# Patient Record
Sex: MALE | Race: WHITE | NOT HISPANIC OR LATINO | Employment: OTHER | URBAN - NONMETROPOLITAN AREA
[De-identification: names, ages, dates, MRNs, and addresses within clinical notes are randomized per-mention and may not be internally consistent; named-entity substitution may affect disease eponyms.]

---

## 2023-06-13 ENCOUNTER — TELEPHONE (OUTPATIENT)
Dept: PRIMARY CARE | Facility: CLINIC | Age: 58
End: 2023-06-13
Payer: MEDICARE

## 2023-06-13 DIAGNOSIS — N52.9 ERECTILE DYSFUNCTION, UNSPECIFIED ERECTILE DYSFUNCTION TYPE: Primary | ICD-10-CM

## 2023-06-13 RX ORDER — ASPIRIN 325 MG
1 TABLET ORAL DAILY
COMMUNITY
Start: 2020-10-23

## 2023-06-13 RX ORDER — METOPROLOL SUCCINATE 100 MG/1
1 TABLET, EXTENDED RELEASE ORAL DAILY
COMMUNITY
Start: 2019-10-31 | End: 2024-03-18 | Stop reason: SDUPTHER

## 2023-06-13 RX ORDER — TADALAFIL 20 MG/1
20 TABLET ORAL DAILY PRN
Qty: 12 TABLET | Refills: 3 | Status: SHIPPED | OUTPATIENT
Start: 2023-06-13 | End: 2024-03-18 | Stop reason: WASHOUT

## 2023-06-13 RX ORDER — OMEPRAZOLE 20 MG/1
1 CAPSULE, DELAYED RELEASE ORAL DAILY
COMMUNITY
Start: 2019-10-31 | End: 2024-03-18 | Stop reason: SDUPTHER

## 2023-06-13 RX ORDER — AMLODIPINE BESYLATE 5 MG/1
1 TABLET ORAL DAILY
COMMUNITY
Start: 2019-10-31 | End: 2024-03-18 | Stop reason: SDUPTHER

## 2023-06-13 RX ORDER — LISINOPRIL 10 MG/1
1 TABLET ORAL DAILY
COMMUNITY
Start: 2019-10-31 | End: 2024-03-18 | Stop reason: ALTCHOICE

## 2024-01-08 ENCOUNTER — TELEPHONE (OUTPATIENT)
Dept: PRIMARY CARE | Facility: CLINIC | Age: 59
End: 2024-01-08
Payer: MEDICARE

## 2024-01-08 NOTE — TELEPHONE ENCOUNTER
Called twice in regards to missed appointment on 01/08/2024, VM full, could not LVM, phone went straight to  both times.

## 2024-03-18 ENCOUNTER — OFFICE VISIT (OUTPATIENT)
Dept: PRIMARY CARE | Facility: CLINIC | Age: 59
End: 2024-03-18
Payer: MEDICARE

## 2024-03-18 ENCOUNTER — LAB (OUTPATIENT)
Dept: LAB | Facility: LAB | Age: 59
End: 2024-03-18
Payer: MEDICARE

## 2024-03-18 VITALS
BODY MASS INDEX: 16.32 KG/M2 | OXYGEN SATURATION: 99 % | SYSTOLIC BLOOD PRESSURE: 78 MMHG | HEART RATE: 73 BPM | HEIGHT: 70 IN | WEIGHT: 114 LBS | DIASTOLIC BLOOD PRESSURE: 60 MMHG

## 2024-03-18 DIAGNOSIS — J86.9 EMPYEMA (MULTI): ICD-10-CM

## 2024-03-18 DIAGNOSIS — R33.9 URINARY RETENTION: ICD-10-CM

## 2024-03-18 DIAGNOSIS — R63.4 WEIGHT LOSS: ICD-10-CM

## 2024-03-18 DIAGNOSIS — I63.10 CEREBROVASCULAR ACCIDENT (CVA) DUE TO EMBOLISM OF PRECEREBRAL ARTERY (MULTI): ICD-10-CM

## 2024-03-18 DIAGNOSIS — K21.00 GASTROESOPHAGEAL REFLUX DISEASE WITH ESOPHAGITIS WITHOUT HEMORRHAGE: Primary | ICD-10-CM

## 2024-03-18 DIAGNOSIS — E78.2 MIXED HYPERLIPIDEMIA: ICD-10-CM

## 2024-03-18 DIAGNOSIS — I10 PRIMARY HYPERTENSION: ICD-10-CM

## 2024-03-18 PROBLEM — K21.9 GERD (GASTROESOPHAGEAL REFLUX DISEASE): Status: ACTIVE | Noted: 2024-03-18

## 2024-03-18 PROBLEM — E78.5 HYPERLIPIDEMIA: Status: ACTIVE | Noted: 2024-03-18

## 2024-03-18 PROBLEM — I63.9 STROKE (MULTI): Status: ACTIVE | Noted: 2024-03-18

## 2024-03-18 PROCEDURE — 82435 ASSAY OF BLOOD CHLORIDE: CPT

## 2024-03-18 PROCEDURE — 84155 ASSAY OF PROTEIN SERUM: CPT

## 2024-03-18 PROCEDURE — 82247 BILIRUBIN TOTAL: CPT

## 2024-03-18 PROCEDURE — 82310 ASSAY OF CALCIUM: CPT

## 2024-03-18 PROCEDURE — 84460 ALANINE AMINO (ALT) (SGPT): CPT

## 2024-03-18 PROCEDURE — 3074F SYST BP LT 130 MM HG: CPT | Performed by: FAMILY MEDICINE

## 2024-03-18 PROCEDURE — 99214 OFFICE O/P EST MOD 30 MIN: CPT | Performed by: FAMILY MEDICINE

## 2024-03-18 PROCEDURE — 84443 ASSAY THYROID STIM HORMONE: CPT

## 2024-03-18 PROCEDURE — 82607 VITAMIN B-12: CPT

## 2024-03-18 PROCEDURE — 36415 COLL VENOUS BLD VENIPUNCTURE: CPT

## 2024-03-18 PROCEDURE — 85025 COMPLETE CBC W/AUTO DIFF WBC: CPT

## 2024-03-18 PROCEDURE — 82374 ASSAY BLOOD CARBON DIOXIDE: CPT

## 2024-03-18 PROCEDURE — 3078F DIAST BP <80 MM HG: CPT | Performed by: FAMILY MEDICINE

## 2024-03-18 PROCEDURE — 84450 TRANSFERASE (AST) (SGOT): CPT

## 2024-03-18 PROCEDURE — 82565 ASSAY OF CREATININE: CPT

## 2024-03-18 PROCEDURE — 84295 ASSAY OF SERUM SODIUM: CPT

## 2024-03-18 PROCEDURE — 84520 ASSAY OF UREA NITROGEN: CPT

## 2024-03-18 PROCEDURE — 84075 ASSAY ALKALINE PHOSPHATASE: CPT

## 2024-03-18 PROCEDURE — 82040 ASSAY OF SERUM ALBUMIN: CPT

## 2024-03-18 RX ORDER — OMEPRAZOLE 40 MG/1
40 CAPSULE, DELAYED RELEASE ORAL DAILY
Qty: 90 CAPSULE | Refills: 3 | Status: SHIPPED | OUTPATIENT
Start: 2024-03-18 | End: 2024-05-22

## 2024-03-18 RX ORDER — LISINOPRIL 10 MG/1
10 TABLET ORAL DAILY
Qty: 90 TABLET | Refills: 3 | Status: CANCELLED | OUTPATIENT
Start: 2024-03-18 | End: 2025-03-18

## 2024-03-18 RX ORDER — AMLODIPINE BESYLATE 5 MG/1
5 TABLET ORAL DAILY
Qty: 90 TABLET | Refills: 3 | Status: SHIPPED | OUTPATIENT
Start: 2024-03-18 | End: 2024-04-01 | Stop reason: DRUGHIGH

## 2024-03-18 RX ORDER — METOPROLOL SUCCINATE 100 MG/1
100 TABLET, EXTENDED RELEASE ORAL DAILY
Qty: 90 TABLET | Refills: 3 | Status: SHIPPED | OUTPATIENT
Start: 2024-03-18 | End: 2024-05-06 | Stop reason: SDUPTHER

## 2024-03-18 ASSESSMENT — ENCOUNTER SYMPTOMS
DIARRHEA: 0
DIZZINESS: 0
ABDOMINAL PAIN: 0
ABDOMINAL DISTENTION: 0
WHEEZING: 0
ACTIVITY CHANGE: 0
LIGHT-HEADEDNESS: 0
CONSTIPATION: 0
UNEXPECTED WEIGHT CHANGE: 1
ADENOPATHY: 0
ARTHRALGIAS: 0
COUGH: 0
VOMITING: 0
TROUBLE SWALLOWING: 0
RHINORRHEA: 0
DYSPHORIC MOOD: 0
DIFFICULTY URINATING: 0
NERVOUS/ANXIOUS: 0
SLEEP DISTURBANCE: 0
APPETITE CHANGE: 0
NAUSEA: 1
FATIGUE: 1
SHORTNESS OF BREATH: 0
PALPITATIONS: 0
NUMBNESS: 0
HEADACHES: 0

## 2024-03-18 NOTE — ASSESSMENT & PLAN NOTE
Prior stroke, blood pressure under good control, may need to have statin, patient dependent on family for help with activities of daily living uses a wheeled walker to get around.

## 2024-03-18 NOTE — ASSESSMENT & PLAN NOTE
Advised about meal supplementation, refer to GI for evaluation of dysphagia, check blood testing today.

## 2024-03-18 NOTE — PROGRESS NOTES
"Subjective   Patient ID: Deep Whyte is a 58 y.o. male who presents for Annual Exam.    HPI   Post hospitlaization for empyema, UGI bleed with Anastasia Wiess tear, was in [facility] after for rehab, has lost 40 pounds since last seen, Home since February, no HHC services, using walker to get around, no falls since home, occ ETOH (reduced from prior), rare tobacco (cut back)  No headache, chest pain, shortness of breath, dizziness, lightheadedness, or edema  Occ dysphagia, no GI follow-up, no constipaiton  No cath in bladder, has F/U with urology next week, no urine flow or stream issues  + appetite, no nausea      Review of Systems   Constitutional:  Positive for fatigue and unexpected weight change. Negative for activity change and appetite change.   HENT:  Negative for congestion, ear pain, nosebleeds, rhinorrhea, sneezing and trouble swallowing.    Respiratory:  Negative for cough, shortness of breath and wheezing.    Cardiovascular:  Negative for chest pain, palpitations and leg swelling.   Gastrointestinal:  Positive for nausea. Negative for abdominal distention, abdominal pain, constipation, diarrhea and vomiting.   Genitourinary:  Negative for difficulty urinating.   Musculoskeletal:  Positive for gait problem. Negative for arthralgias.   Skin:  Negative for rash.   Neurological:  Negative for dizziness, light-headedness, numbness and headaches.   Hematological:  Negative for adenopathy.   Psychiatric/Behavioral:  Negative for behavioral problems, dysphoric mood and sleep disturbance. The patient is not nervous/anxious.    All other systems reviewed and are negative.      Objective   BP 78/60   Pulse 73   Ht 1.778 m (5' 10\")   Wt 51.7 kg (114 lb)   SpO2 99%   BMI 16.36 kg/m²     Physical Exam  Vitals and nursing note reviewed.   Constitutional:       General: He is not in acute distress.     Appearance: Normal appearance. He is not toxic-appearing.   HENT:      Head: Normocephalic and atraumatic.      Right " Ear: Tympanic membrane, ear canal and external ear normal.      Left Ear: Tympanic membrane, ear canal and external ear normal.      Nose: Nose normal.      Mouth/Throat:      Mouth: Mucous membranes are moist.      Pharynx: Oropharynx is clear.   Eyes:      Extraocular Movements: Extraocular movements intact.      Conjunctiva/sclera: Conjunctivae normal.      Pupils: Pupils are equal, round, and reactive to light.   Cardiovascular:      Rate and Rhythm: Normal rate and regular rhythm.      Pulses: Normal pulses.      Heart sounds: Normal heart sounds.   Pulmonary:      Effort: Pulmonary effort is normal.      Breath sounds: Normal breath sounds.   Abdominal:      General: Abdomen is flat. Bowel sounds are normal.      Palpations: Abdomen is soft.   Musculoskeletal:      Cervical back: Normal range of motion and neck supple.   Skin:     General: Skin is warm and dry.      Capillary Refill: Capillary refill takes less than 2 seconds.   Neurological:      General: No focal deficit present.      Mental Status: He is alert and oriented to person, place, and time. Mental status is at baseline.      Cranial Nerves: No cranial nerve deficit.      Motor: Weakness present.      Gait: Gait abnormal.      Comments: Mild spasticity of the legs, uses a wheeled walker to get around since had a stroke several years ago.   Psychiatric:         Mood and Affect: Mood normal.         Behavior: Behavior normal.         Assessment/Plan   Problem List Items Addressed This Visit             ICD-10-CM    Empyema (CMS/[facility]) J86.9     Post chest tube and prolonged hospitalization and nursing home recovery for empyema, currently denies any problems with shortness of breath or coughing.         Relevant Orders    Follow Up In Primary Care - Established    GERD (gastroesophageal reflux disease) - Primary K21.9     Patient complaining of dysphagia, while hospitalized had a history of GI bleeding due to Anastasia-Shirley tear, did have a EGD performed  in November, patient is lost almost 40 pounds since last seen about a year and a half ago, is complaining of persistent dysphagia, increase PPI to 40 mg once a day and refer to GI for possible rescope and dilation.         Relevant Medications    omeprazole (PriLOSEC) 40 mg DR capsule    Other Relevant Orders    Referral to Gastroenterology    Follow Up In Primary Care - Established    Hyperlipidemia E78.5     Will reevaluate for statin in the future.         Relevant Orders    Follow Up In Primary Care - Established    Hypertension I10     Blood pressure is actually low, hold lisinopril for now continue with metoprolol and amlodipine.  Check electrolytes and renal functions.         Relevant Medications    amLODIPine (Norvasc) 5 mg tablet    metoprolol succinate XL (Toprol-XL) 100 mg 24 hr tablet    Other Relevant Orders    Follow Up In Primary Care - Established    Stroke (CMS/[facility]) I63.9     Prior stroke, blood pressure under good control, may need to have statin, patient dependent on family for help with activities of daily living uses a wheeled walker to get around.         Relevant Orders    Follow Up In Primary Care - Established    Urinary retention R33.9     Has an appointment to follow-up with urologist in the next week.         Relevant Orders    Follow Up In Primary Care - Established    Weight loss R63.4     Advised about meal supplementation, refer to GI for evaluation of dysphagia, check blood testing today.           Relevant Orders    CBC and Auto Differential    Comprehensive Metabolic Panel    Vitamin B12    TSH with reflex to Free T4 if abnormal    Referral to Gastroenterology    Follow Up In Primary Care - Established

## 2024-03-18 NOTE — ASSESSMENT & PLAN NOTE
Blood pressure is actually low, hold lisinopril for now continue with metoprolol and amlodipine.  Check electrolytes and renal functions.

## 2024-03-18 NOTE — ASSESSMENT & PLAN NOTE
Post chest tube and prolonged hospitalization and nursing home recovery for empyema, currently denies any problems with shortness of breath or coughing.

## 2024-03-18 NOTE — ASSESSMENT & PLAN NOTE
Patient complaining of dysphagia, while hospitalized had a history of GI bleeding due to Anastasia-Shirley tear, did have a EGD performed in November, patient is lost almost 40 pounds since last seen about a year and a half ago, is complaining of persistent dysphagia, increase PPI to 40 mg once a day and refer to GI for possible rescope and dilation.

## 2024-03-18 NOTE — PATIENT INSTRUCTIONS
Stop lisinopril for now, and increase omeprazole to 40 mg a day.  We are going to have you see a physician to evaluate your swallowing and try to increase calories and weight.

## 2024-03-19 LAB
ALBUMIN SERPL BCP-MCNC: 4.4 G/DL (ref 3.4–5)
ALP SERPL-CCNC: 85 U/L (ref 33–120)
ALT SERPL W P-5'-P-CCNC: 8 U/L (ref 10–52)
ANION GAP SERPL CALC-SCNC: ABNORMAL MMOL/L
AST SERPL W P-5'-P-CCNC: 15 U/L (ref 9–39)
BASOPHILS # BLD AUTO: 0.07 X10*3/UL (ref 0–0.1)
BASOPHILS NFR BLD AUTO: 0.7 %
BILIRUB SERPL-MCNC: 0.9 MG/DL (ref 0–1.2)
BUN SERPL-MCNC: 11 MG/DL (ref 6–23)
CALCIUM SERPL-MCNC: 9.8 MG/DL (ref 8.6–10.6)
CHLORIDE SERPL-SCNC: 102 MMOL/L (ref 98–107)
CO2 SERPL-SCNC: 25 MMOL/L (ref 21–32)
CREAT SERPL-MCNC: 0.77 MG/DL (ref 0.5–1.3)
EGFRCR SERPLBLD CKD-EPI 2021: >90 ML/MIN/1.73M*2
EOSINOPHIL # BLD AUTO: 0.07 X10*3/UL (ref 0–0.7)
EOSINOPHIL NFR BLD AUTO: 0.7 %
ERYTHROCYTE [DISTWIDTH] IN BLOOD BY AUTOMATED COUNT: 18.1 % (ref 11.5–14.5)
GLUCOSE SERPL-MCNC: ABNORMAL MG/DL
HCT VFR BLD AUTO: 52.7 % (ref 41–52)
HGB BLD-MCNC: 15.4 G/DL (ref 13.5–17.5)
IMM GRANULOCYTES # BLD AUTO: 0.03 X10*3/UL (ref 0–0.7)
IMM GRANULOCYTES NFR BLD AUTO: 0.3 % (ref 0–0.9)
LYMPHOCYTES # BLD AUTO: 2.92 X10*3/UL (ref 1.2–4.8)
LYMPHOCYTES NFR BLD AUTO: 29.4 %
MCH RBC QN AUTO: 25.7 PG (ref 26–34)
MCHC RBC AUTO-ENTMCNC: 29.2 G/DL (ref 32–36)
MCV RBC AUTO: 88 FL (ref 80–100)
MONOCYTES # BLD AUTO: 0.61 X10*3/UL (ref 0.1–1)
MONOCYTES NFR BLD AUTO: 6.1 %
NEUTROPHILS # BLD AUTO: 6.22 X10*3/UL (ref 1.2–7.7)
NEUTROPHILS NFR BLD AUTO: 62.8 %
NRBC BLD-RTO: 0 /100 WBCS (ref 0–0)
PLATELET # BLD AUTO: 414 X10*3/UL (ref 150–450)
POTASSIUM SERPL-SCNC: ABNORMAL MMOL/L
PROT SERPL-MCNC: 7.5 G/DL (ref 6.4–8.2)
RBC # BLD AUTO: 6 X10*6/UL (ref 4.5–5.9)
SODIUM SERPL-SCNC: 141 MMOL/L (ref 136–145)
TSH SERPL-ACNC: 1.15 MIU/L (ref 0.44–3.98)
VIT B12 SERPL-MCNC: 730 PG/ML (ref 211–911)
WBC # BLD AUTO: 9.9 X10*3/UL (ref 4.4–11.3)

## 2024-04-01 ENCOUNTER — OFFICE VISIT (OUTPATIENT)
Dept: PRIMARY CARE | Facility: CLINIC | Age: 59
End: 2024-04-01
Payer: MEDICARE

## 2024-04-01 VITALS
OXYGEN SATURATION: 98 % | SYSTOLIC BLOOD PRESSURE: 90 MMHG | DIASTOLIC BLOOD PRESSURE: 70 MMHG | HEART RATE: 80 BPM | HEIGHT: 70 IN | WEIGHT: 113.3 LBS | BODY MASS INDEX: 16.22 KG/M2

## 2024-04-01 DIAGNOSIS — I63.10 CEREBROVASCULAR ACCIDENT (CVA) DUE TO EMBOLISM OF PRECEREBRAL ARTERY (MULTI): ICD-10-CM

## 2024-04-01 DIAGNOSIS — I10 PRIMARY HYPERTENSION: ICD-10-CM

## 2024-04-01 DIAGNOSIS — R63.4 WEIGHT LOSS: ICD-10-CM

## 2024-04-01 DIAGNOSIS — K21.00 GASTROESOPHAGEAL REFLUX DISEASE WITH ESOPHAGITIS WITHOUT HEMORRHAGE: ICD-10-CM

## 2024-04-01 DIAGNOSIS — J86.9 EMPYEMA (MULTI): ICD-10-CM

## 2024-04-01 DIAGNOSIS — R33.9 URINARY RETENTION: ICD-10-CM

## 2024-04-01 DIAGNOSIS — E78.2 MIXED HYPERLIPIDEMIA: ICD-10-CM

## 2024-04-01 PROCEDURE — 3078F DIAST BP <80 MM HG: CPT | Performed by: FAMILY MEDICINE

## 2024-04-01 PROCEDURE — 99213 OFFICE O/P EST LOW 20 MIN: CPT | Performed by: FAMILY MEDICINE

## 2024-04-01 PROCEDURE — 3074F SYST BP LT 130 MM HG: CPT | Performed by: FAMILY MEDICINE

## 2024-04-01 RX ORDER — AMLODIPINE BESYLATE 5 MG/1
2.5 TABLET ORAL DAILY
Qty: 45 TABLET | Refills: 3 | Status: SHIPPED | OUTPATIENT
Start: 2024-04-01 | End: 2025-04-01

## 2024-04-01 ASSESSMENT — ENCOUNTER SYMPTOMS
SPEECH DIFFICULTY: 1
DYSPHORIC MOOD: 0
SLEEP DISTURBANCE: 0
APPETITE CHANGE: 0
LIGHT-HEADEDNESS: 0
CONSTIPATION: 0
NAUSEA: 1
SHORTNESS OF BREATH: 0
BACK PAIN: 0
WEAKNESS: 1
ABDOMINAL PAIN: 0
ABDOMINAL DISTENTION: 0
FATIGUE: 0
PALPITATIONS: 0
CHEST TIGHTNESS: 0
ACTIVITY CHANGE: 0
NERVOUS/ANXIOUS: 0
COUGH: 0
DIARRHEA: 0
VOMITING: 0
UNEXPECTED WEIGHT CHANGE: 1
HEADACHES: 0

## 2024-04-01 NOTE — PROGRESS NOTES
"Subjective   Patient ID: Deep Whyte is a 58 y.o. male who presents for 2 wk ck.    HPI   No headache, chest pain, shortness of breath, dizziness, lightheadedness, or edema  Does not see GI till May  Mouth gets full, + dysphagia  Using a walker, appetite +/- usually has trouble in AM with swallowing, + burping and belching    Review of Systems   Constitutional:  Positive for unexpected weight change. Negative for activity change, appetite change and fatigue.   HENT:  Positive for drooling.    Respiratory:  Negative for cough, chest tightness and shortness of breath.    Cardiovascular:  Negative for chest pain, palpitations and leg swelling.   Gastrointestinal:  Positive for nausea. Negative for abdominal distention, abdominal pain, constipation, diarrhea and vomiting.   Musculoskeletal:  Positive for gait problem. Negative for back pain.   Neurological:  Positive for speech difficulty and weakness. Negative for light-headedness and headaches.   Psychiatric/Behavioral:  Negative for dysphoric mood and sleep disturbance. The patient is not nervous/anxious.        Objective   BP 90/70   Pulse 80   Ht 1.778 m (5' 10\")   Wt 51.4 kg (113 lb 4.8 oz)   SpO2 98%   BMI 16.26 kg/m²     Physical Exam  Vitals and nursing note reviewed.   Constitutional:       General: He is not in acute distress.     Appearance: Normal appearance. He is not toxic-appearing.   HENT:      Head: Normocephalic and atraumatic.      Right Ear: Tympanic membrane, ear canal and external ear normal.      Left Ear: Tympanic membrane, ear canal and external ear normal.      Nose: Nose normal.      Mouth/Throat:      Mouth: Mucous membranes are moist.      Pharynx: Oropharynx is clear.   Eyes:      Extraocular Movements: Extraocular movements intact.      Conjunctiva/sclera: Conjunctivae normal.      Pupils: Pupils are equal, round, and reactive to light.   Cardiovascular:      Rate and Rhythm: Normal rate and regular rhythm.      Pulses: Normal " pulses.      Heart sounds: Normal heart sounds.   Pulmonary:      Effort: Pulmonary effort is normal.      Breath sounds: Normal breath sounds.   Abdominal:      General: Abdomen is flat. Bowel sounds are normal.      Palpations: Abdomen is soft.   Musculoskeletal:      Cervical back: Normal range of motion and neck supple.   Skin:     General: Skin is warm and dry.      Capillary Refill: Capillary refill takes less than 2 seconds.   Neurological:      Mental Status: He is alert and oriented to person, place, and time. Mental status is at baseline.   Psychiatric:         Mood and Affect: Mood normal.         Behavior: Behavior normal.         Assessment/Plan   Problem List Items Addressed This Visit             ICD-10-CM    RESOLVED: Empyema (CMS/[facility]) J86.9    Relevant Orders    Follow Up In Primary Care - Established    GERD (gastroesophageal reflux disease) K21.9     Concerned about persistent dysphagia, weight loss, will contact gastroenterology to move up appointment for EGD.         Relevant Orders    Follow Up In Primary Care - Established    Hyperlipidemia E78.5     Will need to reintroduce statin when patient more stable.         Relevant Orders    Follow Up In Primary Care - Established    Hypertension I10     Try reducing amlodipine to half a tablet a day, doing okay off of ACE inhibitor, continue to wean blood pressure medications as the patient is hypotensive.         Relevant Medications    amLODIPine (Norvasc) 5 mg tablet    Other Relevant Orders    Follow Up In Primary Care - Established    Stroke (CMS/[facility]) I63.9     Currently stable, using a wheeled walker to get around no falls, concerned about weight loss.         Relevant Orders    Follow Up In Primary Care - Established    Urinary retention R33.9    Relevant Orders    Follow Up In Primary Care - Established    Weight loss R63.4     Needs EGD, advised about meal supplementation continue with PPI.         Relevant Orders    Follow Up In Primary  Care - Established

## 2024-04-01 NOTE — PATIENT INSTRUCTIONS
Reduce amlodipine to 1/2 a tablet a day, we are going to see if we can get you to see GI sooner than May.

## 2024-04-01 NOTE — ASSESSMENT & PLAN NOTE
Try reducing amlodipine to half a tablet a day, doing okay off of ACE inhibitor, continue to wean blood pressure medications as the patient is hypotensive.

## 2024-04-01 NOTE — ASSESSMENT & PLAN NOTE
Concerned about persistent dysphagia, weight loss, will contact gastroenterology to move up appointment for EGD.

## 2024-05-06 ENCOUNTER — OFFICE VISIT (OUTPATIENT)
Dept: PRIMARY CARE | Facility: CLINIC | Age: 59
End: 2024-05-06
Payer: MEDICARE

## 2024-05-06 VITALS
HEART RATE: 103 BPM | SYSTOLIC BLOOD PRESSURE: 106 MMHG | OXYGEN SATURATION: 98 % | WEIGHT: 119.2 LBS | DIASTOLIC BLOOD PRESSURE: 70 MMHG | HEIGHT: 70 IN | BODY MASS INDEX: 17.07 KG/M2

## 2024-05-06 DIAGNOSIS — I10 PRIMARY HYPERTENSION: ICD-10-CM

## 2024-05-06 DIAGNOSIS — E46 PROTEIN-CALORIE MALNUTRITION, UNSPECIFIED SEVERITY (MULTI): ICD-10-CM

## 2024-05-06 DIAGNOSIS — I63.10 CEREBROVASCULAR ACCIDENT (CVA) DUE TO EMBOLISM OF PRECEREBRAL ARTERY (MULTI): Primary | ICD-10-CM

## 2024-05-06 PROCEDURE — 99213 OFFICE O/P EST LOW 20 MIN: CPT | Performed by: FAMILY MEDICINE

## 2024-05-06 PROCEDURE — 3078F DIAST BP <80 MM HG: CPT | Performed by: FAMILY MEDICINE

## 2024-05-06 PROCEDURE — 4004F PT TOBACCO SCREEN RCVD TLK: CPT | Performed by: FAMILY MEDICINE

## 2024-05-06 PROCEDURE — 3074F SYST BP LT 130 MM HG: CPT | Performed by: FAMILY MEDICINE

## 2024-05-06 RX ORDER — METOPROLOL SUCCINATE 50 MG/1
50 TABLET, EXTENDED RELEASE ORAL DAILY
Qty: 90 TABLET | Refills: 3 | Status: SHIPPED | OUTPATIENT
Start: 2024-05-06 | End: 2025-05-06

## 2024-05-06 ASSESSMENT — ENCOUNTER SYMPTOMS
DYSPHORIC MOOD: 0
SHORTNESS OF BREATH: 0
CHEST TIGHTNESS: 0
CONSTIPATION: 0
TREMORS: 0
HEADACHES: 0
SLEEP DISTURBANCE: 0
COUGH: 0
NAUSEA: 1
PALPITATIONS: 0
LIGHT-HEADEDNESS: 1
ACTIVITY CHANGE: 0
NERVOUS/ANXIOUS: 0
VOMITING: 0
ABDOMINAL PAIN: 0
FATIGUE: 0
DIARRHEA: 0
DIZZINESS: 0
APPETITE CHANGE: 0

## 2024-05-06 NOTE — ASSESSMENT & PLAN NOTE
Patient's weight down, counseled about smoking and alcohol use, encouraged to use Ensure if not eating meals.

## 2024-05-06 NOTE — ASSESSMENT & PLAN NOTE
Patient self discontinued beta-blocker, pulse rate up, advised not to do that, reduce metoprolol to 50 mg a day, continue with 2-1/2 mg of amlodipine, advised about alcohol and tobacco use and encouraged to reduce and discontinue these substances.

## 2024-05-06 NOTE — ASSESSMENT & PLAN NOTE
Blood pressure stable currently, will need to start statin in the future, having trouble with dysphagia and weight loss, to see GI in the next month.

## 2024-05-06 NOTE — ASSESSMENT & PLAN NOTE
Significant issues with dysphagia and aspiration, to see GI in the next 1 to 2 weeks.  Needs EGD history of Anastasia-Shirley tear, again advised to reduce alcohol intake due to irritation of the stomach.

## 2024-05-06 NOTE — PROGRESS NOTES
"Subjective   Patient ID: Anthony Whyte is a 58 y.o. male who presents for 1 MO F/U.    HPI   No headache, chest pain, shortness of breath, dizziness, lightheadedness, or edema  Stopped metoprolol (ran out of metoprolol)  Still having dysphagia issues, + appetite, + regurgitation issues, some constipation at times  Some LH at times, using a walker, no falls  Urine OK, ETOH (increased to 24 a week), tobacco use 1 pk every few days      Review of Systems   Constitutional:  Negative for activity change, appetite change and fatigue.   Respiratory:  Negative for cough, chest tightness and shortness of breath.    Cardiovascular:  Negative for chest pain, palpitations and leg swelling.   Gastrointestinal:  Positive for nausea. Negative for abdominal pain, constipation, diarrhea and vomiting.   Musculoskeletal:  Positive for gait problem.   Neurological:  Positive for light-headedness. Negative for dizziness, tremors and headaches.   Psychiatric/Behavioral:  Negative for dysphoric mood and sleep disturbance. The patient is not nervous/anxious.        Objective   /70   Pulse 103   Ht 1.778 m (5' 10\")   Wt 54.1 kg (119 lb 3.2 oz)   SpO2 98%   BMI 17.10 kg/m²     Physical Exam  Vitals and nursing note reviewed.   Constitutional:       Appearance: Normal appearance.   HENT:      Head: Normocephalic and atraumatic.      Right Ear: Tympanic membrane, ear canal and external ear normal.      Left Ear: Tympanic membrane, ear canal and external ear normal.      Nose: Nose normal.      Mouth/Throat:      Mouth: Mucous membranes are moist.      Pharynx: Oropharynx is clear.   Cardiovascular:      Rate and Rhythm: Normal rate and regular rhythm.      Pulses: Normal pulses.      Heart sounds: Normal heart sounds.   Pulmonary:      Effort: Pulmonary effort is normal.      Breath sounds: Normal breath sounds.   Musculoskeletal:      Cervical back: Normal range of motion and neck supple.   Neurological:      Mental Status: He is " alert.   Psychiatric:         Mood and Affect: Mood normal.         Behavior: Behavior normal.         Assessment/Plan   Problem List Items Addressed This Visit             ICD-10-CM    Hypertension I10     Patient self discontinued beta-blocker, pulse rate up, advised not to do that, reduce metoprolol to 50 mg a day, continue with 2-1/2 mg of amlodipine, advised about alcohol and tobacco use and encouraged to reduce and discontinue these substances.         Relevant Medications    metoprolol succinate XL (Toprol-XL) 50 mg 24 hr tablet    Other Relevant Orders    Follow Up In Primary Care - Established    Stroke (Multi) - Primary I63.9     Blood pressure stable currently, will need to start statin in the future, having trouble with dysphagia and weight loss, to see GI in the next month.         Protein-calorie malnutrition, unspecified severity (Multi) E46     Patient's weight down, counseled about smoking and alcohol use, encouraged to use Ensure if not eating meals.

## 2024-05-20 ENCOUNTER — OFFICE VISIT (OUTPATIENT)
Dept: GASTROENTEROLOGY | Facility: CLINIC | Age: 59
End: 2024-05-20
Payer: MEDICARE

## 2024-05-20 VITALS — BODY MASS INDEX: 17.07 KG/M2 | WEIGHT: 119 LBS

## 2024-05-20 DIAGNOSIS — R13.19 ESOPHAGEAL DYSPHAGIA: Primary | ICD-10-CM

## 2024-05-20 DIAGNOSIS — K21.00 GASTROESOPHAGEAL REFLUX DISEASE WITH ESOPHAGITIS WITHOUT HEMORRHAGE: ICD-10-CM

## 2024-05-20 DIAGNOSIS — R13.10 DYSPHAGIA, UNSPECIFIED TYPE: ICD-10-CM

## 2024-05-20 DIAGNOSIS — R63.4 WEIGHT LOSS: ICD-10-CM

## 2024-05-20 PROCEDURE — 99204 OFFICE O/P NEW MOD 45 MIN: CPT | Performed by: INTERNAL MEDICINE

## 2024-05-20 NOTE — PROGRESS NOTES
Subjective   Patient ID: Anthony Whyte is a 58 y.o. male who presents for GERD (Food is getting stuck on the way down and then will have to bring it back up. ) and Weight Loss (Pt reports 160lbs approx 8 months to one year ago now 119lbs).  AGUSTÍN Snyder is a 58-year-old male who was hospitalized back in mid winter with acute alcohol withdrawal at [facility] in [place].  He underwent multiple imaging studies including esophagram and modified barium swallow but no evaluation was done of his distal esophagus.  Because of his significant with alcohol withdrawal the focus was to get him through the process.  After discharge had been following puréed diet and now is having trouble eating and consuming enough food to maintain his weight.  He has dropped 10 pounds in the last month.  Is referred for evaluation for possible esophageal stricture or malignancy.    He denies any melanic stools no hematemesis he is not anemic.  He continues to drink despite his recent hospitalization for acute alcohol withdrawal he is consuming about a 12 pack week.  He denies any family history of liver failure he himself has had no diagnosis of cirrhosis  Review of Systems   Constitutional:  Positive for fatigue. Negative for chills, diaphoresis and fever.   HENT: Negative.     Eyes: Negative.    Respiratory: Negative.     Cardiovascular: Negative.    Gastrointestinal: Negative.  Negative for abdominal distention, abdominal pain, anal bleeding, blood in stool, constipation and diarrhea.   Endocrine: Negative.    Genitourinary: Negative.    Musculoskeletal: Negative.    Neurological: Negative.    Hematological: Negative.    Psychiatric/Behavioral: Negative.         Objective   Physical Exam  Vitals and nursing note reviewed.   Constitutional:       Appearance: Normal appearance.   HENT:      Head: Normocephalic.      Mouth/Throat:      Mouth: Mucous membranes are moist.      Pharynx: Oropharynx is clear.   Eyes:      Pupils: Pupils are equal,  round, and reactive to light.   Cardiovascular:      Rate and Rhythm: Normal rate and regular rhythm.      Heart sounds: Normal heart sounds.   Pulmonary:      Effort: Pulmonary effort is normal.      Breath sounds: Normal breath sounds.   Abdominal:      General: Abdomen is flat. Bowel sounds are normal.      Palpations: Abdomen is soft.   Musculoskeletal:         General: Normal range of motion.      Cervical back: Normal range of motion and neck supple.   Skin:     General: Skin is warm and dry.   Neurological:      General: No focal deficit present.      Mental Status: He is alert and oriented to person, place, and time.   Psychiatric:         Mood and Affect: Mood normal.         Behavior: Behavior normal.         Assessment/Plan   Diagnoses and all orders for this visit:  Esophageal dysphagia  Gastroesophageal reflux disease with esophagitis without hemorrhage  -     Referral to Gastroenterology  Weight loss  -     Referral to Gastroenterology       Continue PPI therapy arrange EGD for diagnostic reasons    Jamar Jenkins DO 05/20/24 11:57 AM

## 2024-05-21 ENCOUNTER — ANESTHESIA EVENT (OUTPATIENT)
Dept: OPERATING ROOM | Facility: HOSPITAL | Age: 59
End: 2024-05-21
Payer: MEDICARE

## 2024-05-21 RX ORDER — ONDANSETRON HYDROCHLORIDE 2 MG/ML
4 INJECTION, SOLUTION INTRAVENOUS ONCE AS NEEDED
OUTPATIENT
Start: 2024-05-21

## 2024-05-21 RX ORDER — SODIUM CHLORIDE, SODIUM LACTATE, POTASSIUM CHLORIDE, CALCIUM CHLORIDE 600; 310; 30; 20 MG/100ML; MG/100ML; MG/100ML; MG/100ML
100 INJECTION, SOLUTION INTRAVENOUS CONTINUOUS
OUTPATIENT
Start: 2024-05-21

## 2024-05-22 ENCOUNTER — HOSPITAL ENCOUNTER (OUTPATIENT)
Dept: OPERATING ROOM | Facility: HOSPITAL | Age: 59
Setting detail: OUTPATIENT SURGERY
Discharge: HOME | End: 2024-05-22
Payer: MEDICARE

## 2024-05-22 ENCOUNTER — ANESTHESIA (OUTPATIENT)
Dept: OPERATING ROOM | Facility: HOSPITAL | Age: 59
End: 2024-05-22
Payer: MEDICARE

## 2024-05-22 VITALS
DIASTOLIC BLOOD PRESSURE: 73 MMHG | RESPIRATION RATE: 16 BRPM | BODY MASS INDEX: 17.01 KG/M2 | TEMPERATURE: 97.1 F | WEIGHT: 118.83 LBS | SYSTOLIC BLOOD PRESSURE: 99 MMHG | OXYGEN SATURATION: 99 % | HEIGHT: 70 IN | HEART RATE: 70 BPM

## 2024-05-22 DIAGNOSIS — R13.10 DYSPHAGIA, UNSPECIFIED TYPE: ICD-10-CM

## 2024-05-22 DIAGNOSIS — K21.00 GASTROESOPHAGEAL REFLUX DISEASE WITH ESOPHAGITIS WITHOUT HEMORRHAGE: ICD-10-CM

## 2024-05-22 PROBLEM — J18.9 PNEUMONIA: Status: ACTIVE | Noted: 2024-05-22

## 2024-05-22 PROBLEM — K70.9 ALCOHOLIC LIVER DISEASE (MULTI): Status: ACTIVE | Noted: 2024-05-22

## 2024-05-22 PROCEDURE — 2500000004 HC RX 250 GENERAL PHARMACY W/ HCPCS (ALT 636 FOR OP/ED): Performed by: ANESTHESIOLOGY

## 2024-05-22 PROCEDURE — 2500000005 HC RX 250 GENERAL PHARMACY W/O HCPCS: Performed by: ANESTHESIOLOGY

## 2024-05-22 PROCEDURE — 3700000002 HC GENERAL ANESTHESIA TIME - EACH INCREMENTAL 1 MINUTE: Performed by: INTERNAL MEDICINE

## 2024-05-22 PROCEDURE — 3600000007 HC OR TIME - EACH INCREMENTAL 1 MINUTE - PROCEDURE LEVEL TWO: Performed by: INTERNAL MEDICINE

## 2024-05-22 PROCEDURE — 88305 TISSUE EXAM BY PATHOLOGIST: CPT | Performed by: PATHOLOGY

## 2024-05-22 PROCEDURE — 7100000009 HC PHASE TWO TIME - INITIAL BASE CHARGE: Performed by: INTERNAL MEDICINE

## 2024-05-22 PROCEDURE — 2720000007 HC OR 272 NO HCPCS: Performed by: INTERNAL MEDICINE

## 2024-05-22 PROCEDURE — 3600000002 HC OR TIME - INITIAL BASE CHARGE - PROCEDURE LEVEL TWO: Performed by: INTERNAL MEDICINE

## 2024-05-22 PROCEDURE — 2500000005 HC RX 250 GENERAL PHARMACY W/O HCPCS: Performed by: INTERNAL MEDICINE

## 2024-05-22 PROCEDURE — 0753T DGTZ GLS MCRSCP SLD LEVEL IV: CPT | Mod: TC,SAMLAB | Performed by: INTERNAL MEDICINE

## 2024-05-22 PROCEDURE — 7100000010 HC PHASE TWO TIME - EACH INCREMENTAL 1 MINUTE: Performed by: INTERNAL MEDICINE

## 2024-05-22 PROCEDURE — 3700000001 HC GENERAL ANESTHESIA TIME - INITIAL BASE CHARGE: Performed by: INTERNAL MEDICINE

## 2024-05-22 PROCEDURE — 43249 ESOPH EGD DILATION <30 MM: CPT | Performed by: INTERNAL MEDICINE

## 2024-05-22 PROCEDURE — C1726 CATH, BAL DIL, NON-VASCULAR: HCPCS | Performed by: INTERNAL MEDICINE

## 2024-05-22 RX ORDER — SODIUM CHLORIDE, SODIUM LACTATE, POTASSIUM CHLORIDE, CALCIUM CHLORIDE 600; 310; 30; 20 MG/100ML; MG/100ML; MG/100ML; MG/100ML
50 INJECTION, SOLUTION INTRAVENOUS CONTINUOUS
Status: DISCONTINUED | OUTPATIENT
Start: 2024-05-22 | End: 2024-05-23 | Stop reason: HOSPADM

## 2024-05-22 RX ORDER — FAMOTIDINE 10 MG/ML
20 INJECTION INTRAVENOUS ONCE
Status: COMPLETED | OUTPATIENT
Start: 2024-05-22 | End: 2024-05-22

## 2024-05-22 RX ORDER — LIDOCAINE HYDROCHLORIDE 10 MG/ML
INJECTION, SOLUTION EPIDURAL; INFILTRATION; INTRACAUDAL; PERINEURAL AS NEEDED
Status: DISCONTINUED | OUTPATIENT
Start: 2024-05-22 | End: 2024-05-22

## 2024-05-22 RX ORDER — MIDAZOLAM HYDROCHLORIDE 1 MG/ML
1 INJECTION INTRAMUSCULAR; INTRAVENOUS ONCE
Status: COMPLETED | OUTPATIENT
Start: 2024-05-22 | End: 2024-05-22

## 2024-05-22 RX ORDER — MIDAZOLAM HYDROCHLORIDE 1 MG/ML
INJECTION INTRAMUSCULAR; INTRAVENOUS AS NEEDED
Status: DISCONTINUED | OUTPATIENT
Start: 2024-05-22 | End: 2024-05-22

## 2024-05-22 RX ORDER — OMEPRAZOLE 40 MG/1
40 CAPSULE, DELAYED RELEASE ORAL
Qty: 60 CAPSULE | Refills: 11 | Status: SHIPPED | OUTPATIENT
Start: 2024-05-22 | End: 2025-05-22

## 2024-05-22 RX ORDER — ONDANSETRON HYDROCHLORIDE 2 MG/ML
4 INJECTION, SOLUTION INTRAVENOUS ONCE
Status: COMPLETED | OUTPATIENT
Start: 2024-05-22 | End: 2024-05-22

## 2024-05-22 RX ORDER — FENTANYL CITRATE 50 UG/ML
INJECTION, SOLUTION INTRAMUSCULAR; INTRAVENOUS AS NEEDED
Status: DISCONTINUED | OUTPATIENT
Start: 2024-05-22 | End: 2024-05-22

## 2024-05-22 RX ORDER — PROPOFOL 10 MG/ML
INJECTION, EMULSION INTRAVENOUS AS NEEDED
Status: DISCONTINUED | OUTPATIENT
Start: 2024-05-22 | End: 2024-05-22

## 2024-05-22 RX ADMIN — MIDAZOLAM HYDROCHLORIDE 0.5 MG: 1 INJECTION, SOLUTION INTRAMUSCULAR; INTRAVENOUS at 08:13

## 2024-05-22 RX ADMIN — MIDAZOLAM HYDROCHLORIDE 1 MG: 1 INJECTION, SOLUTION INTRAMUSCULAR; INTRAVENOUS at 07:43

## 2024-05-22 RX ADMIN — FENTANYL CITRATE 25 MCG: 50 INJECTION, SOLUTION INTRAMUSCULAR; INTRAVENOUS at 08:00

## 2024-05-22 RX ADMIN — Medication 10 MG: at 08:01

## 2024-05-22 RX ADMIN — PROPOFOL 20 MG: 10 INJECTION, EMULSION INTRAVENOUS at 08:03

## 2024-05-22 RX ADMIN — LIDOCAINE HYDROCHLORIDE 30 MG: 10 INJECTION, SOLUTION EPIDURAL; INFILTRATION; INTRACAUDAL; PERINEURAL at 08:00

## 2024-05-22 RX ADMIN — PROPOFOL 10 MG: 10 INJECTION, EMULSION INTRAVENOUS at 08:25

## 2024-05-22 RX ADMIN — PROPOFOL 20 MG: 10 INJECTION, EMULSION INTRAVENOUS at 08:05

## 2024-05-22 RX ADMIN — MIDAZOLAM HYDROCHLORIDE 0.5 MG: 1 INJECTION, SOLUTION INTRAMUSCULAR; INTRAVENOUS at 08:04

## 2024-05-22 RX ADMIN — SODIUM CHLORIDE, POTASSIUM CHLORIDE, SODIUM LACTATE AND CALCIUM CHLORIDE 50 ML/HR: 600; 310; 30; 20 INJECTION, SOLUTION INTRAVENOUS at 07:11

## 2024-05-22 RX ADMIN — PROPOFOL 20 MG: 10 INJECTION, EMULSION INTRAVENOUS at 08:09

## 2024-05-22 RX ADMIN — FAMOTIDINE 20 MG: 10 INJECTION, SOLUTION INTRAVENOUS at 07:12

## 2024-05-22 RX ADMIN — PROPOFOL 20 MG: 10 INJECTION, EMULSION INTRAVENOUS at 08:21

## 2024-05-22 RX ADMIN — FENTANYL CITRATE 25 MCG: 50 INJECTION, SOLUTION INTRAMUSCULAR; INTRAVENOUS at 08:04

## 2024-05-22 RX ADMIN — PROPOFOL 20 MG: 10 INJECTION, EMULSION INTRAVENOUS at 08:14

## 2024-05-22 RX ADMIN — ONDANSETRON 4 MG: 2 INJECTION INTRAMUSCULAR; INTRAVENOUS at 07:12

## 2024-05-22 RX ADMIN — BENZOCAINE, BUTAMBEN, AND TETRACAINE HYDROCHLORIDE 2 SPRAY: .028; .004; .004 AEROSOL, SPRAY TOPICAL at 08:01

## 2024-05-22 SDOH — HEALTH STABILITY: MENTAL HEALTH: CURRENT SMOKER: 1

## 2024-05-22 ASSESSMENT — PAIN SCALES - GENERAL
PAINLEVEL_OUTOF10: 3
PAIN_LEVEL: 0
PAINLEVEL_OUTOF10: 0 - NO PAIN

## 2024-05-22 ASSESSMENT — COLUMBIA-SUICIDE SEVERITY RATING SCALE - C-SSRS
6. HAVE YOU EVER DONE ANYTHING, STARTED TO DO ANYTHING, OR PREPARED TO DO ANYTHING TO END YOUR LIFE?: NO
2. HAVE YOU ACTUALLY HAD ANY THOUGHTS OF KILLING YOURSELF?: NO

## 2024-05-22 ASSESSMENT — PAIN - FUNCTIONAL ASSESSMENT
PAIN_FUNCTIONAL_ASSESSMENT: 0-10
PAIN_FUNCTIONAL_ASSESSMENT: 0-10

## 2024-05-22 NOTE — ANESTHESIA POSTPROCEDURE EVALUATION
"Patient: Deep Whyte \"Luke\"    Procedure Summary       Date: 05/22/24 Room / Location: [facility] OR    Anesthesia Start: 0758 Anesthesia Stop: 0835    Procedure: EGD Diagnosis: Dysphagia, unspecified type    Scheduled Providers: Jamar Jenkins DO; Matteo Cooley DO Responsible Provider: Matteo Cooley DO    Anesthesia Type: MAC ASA Status: 3            Anesthesia Type: MAC    Vitals Value Taken Time   BP 99/74 05/22/24 0833   Temp 36.2 °C (97.1 °F) 05/22/24 0833   Pulse 76 05/22/24 0833   Resp 16 05/22/24 0833   SpO2 100 % 05/22/24 0833       Anesthesia Post Evaluation    Patient location during evaluation: bedside  Patient participation: complete - patient participated  Level of consciousness: sleepy but conscious  Pain score: 0  Pain management: adequate  Multimodal analgesia pain management approach  Airway patency: patent  Cardiovascular status: acceptable  Respiratory status: acceptable  Hydration status: acceptable  Postoperative Nausea and Vomiting: none  Comments: Awakens to verbal.  VSS.  Denies pain or nausea.        No notable events documented.    "

## 2024-05-22 NOTE — ANESTHESIA PREPROCEDURE EVALUATION
"Patient: Deep Whyte \"Luke\"    Procedure Information       Date/Time: 05/22/24 0740    Scheduled providers: Jamar Jenkins DO; Matteo Cooley DO    Procedure: EGD    Location: [facility] OR            Relevant Problems   Anesthesia  No prior GA   (-) Family history of malignant hyperthermia      Cardiac   (+) Hyperlipidemia   (+) Hypertension      Pulmonary  Smoker 1/4 ppd.    Hx empyema,  Bilateral pneumonia fall 2023. Chest tube.   (+) Pneumonia      Neuro  Uses walker    (+) Stroke (Multi)      GI   (+) GERD (gastroesophageal reflux disease)      Liver  Remote heavy alcohol abuse.  States he has  a couple drinks per day.   (+) Alcoholic liver disease (Multi)      Endocrine (within normal limits)       Clinical information reviewed:   Tobacco  Allergies  Meds   Med Hx  Surg Hx   Fam Hx  Soc Hx      Problem list reviewed.  NPO Detail:  NPO/Void Status  Date of Last Liquid: 05/21/24  Time of Last Liquid: 1400  Date of Last Solid: 05/21/24  Time of Last Solid: 1400  Last Intake Type: Clear fluids; Light meal         Physical Exam    Airway  Mallampati: I  TM distance: >3 FB  Neck ROM: full     Cardiovascular   Rhythm: regular  Rate: normal     Dental   Comments: Very poor dentition. States none loose.  Risk of dental injury discussed.  He stated they all need pulled.   Pulmonary - normal exam     Abdominal            Anesthesia Plan    History of general anesthesia?: no  History of complications of general anesthesia?: no    ASA 3     MAC     The patient is a current smoker.  Patient did not smoke on day of procedure.    intravenous induction   Anesthetic plan and risks discussed with patient.    MAC discussed with Patient.  Plan and process discussed for anesthesia for procedure.  Risks and benefits discussed.  All questions answered with patient.  The patient understands plan and wishes to proceed. GA discussed as back up.  "

## 2024-05-22 NOTE — Clinical Note
Patient tolerated the procedure well and is comfortable with no complaints of pain. Vital signs stable. Arousable prior to transport. Patient transported via stretcher. Handoff completed.

## 2024-05-22 NOTE — DISCHARGE INSTRUCTIONS

## 2024-05-24 PROBLEM — R13.19 ESOPHAGEAL DYSPHAGIA: Status: ACTIVE | Noted: 2024-05-24

## 2024-05-24 ASSESSMENT — ENCOUNTER SYMPTOMS
GASTROINTESTINAL NEGATIVE: 1
ABDOMINAL PAIN: 0
CHILLS: 0
DIARRHEA: 0
MUSCULOSKELETAL NEGATIVE: 1
EYES NEGATIVE: 1
DIAPHORESIS: 0
HEMATOLOGIC/LYMPHATIC NEGATIVE: 1
FATIGUE: 1
NEUROLOGICAL NEGATIVE: 1
CARDIOVASCULAR NEGATIVE: 1
ANAL BLEEDING: 0
PSYCHIATRIC NEGATIVE: 1
BLOOD IN STOOL: 0
ABDOMINAL DISTENTION: 0
ENDOCRINE NEGATIVE: 1
FEVER: 0
CONSTIPATION: 0
RESPIRATORY NEGATIVE: 1

## 2024-05-29 LAB
LABORATORY COMMENT REPORT: NORMAL
PATH REPORT.FINAL DX SPEC: NORMAL
PATH REPORT.GROSS SPEC: NORMAL
PATH REPORT.TOTAL CANCER: NORMAL

## 2024-06-04 DIAGNOSIS — R13.19 ESOPHAGEAL DYSPHAGIA: ICD-10-CM

## 2024-06-17 ENCOUNTER — APPOINTMENT (OUTPATIENT)
Dept: PRIMARY CARE | Facility: CLINIC | Age: 59
End: 2024-06-17
Payer: MEDICARE

## 2024-06-17 VITALS
DIASTOLIC BLOOD PRESSURE: 80 MMHG | HEART RATE: 83 BPM | HEIGHT: 70 IN | SYSTOLIC BLOOD PRESSURE: 108 MMHG | OXYGEN SATURATION: 100 % | WEIGHT: 127.1 LBS | BODY MASS INDEX: 18.2 KG/M2

## 2024-06-17 DIAGNOSIS — I63.10 CEREBROVASCULAR ACCIDENT (CVA) DUE TO EMBOLISM OF PRECEREBRAL ARTERY (MULTI): Primary | ICD-10-CM

## 2024-06-17 DIAGNOSIS — Z12.5 SCREENING FOR PROSTATE CANCER: ICD-10-CM

## 2024-06-17 DIAGNOSIS — E46 PROTEIN-CALORIE MALNUTRITION, UNSPECIFIED SEVERITY (MULTI): ICD-10-CM

## 2024-06-17 DIAGNOSIS — K21.00 GASTROESOPHAGEAL REFLUX DISEASE WITH ESOPHAGITIS WITHOUT HEMORRHAGE: ICD-10-CM

## 2024-06-17 DIAGNOSIS — K70.9 ALCOHOLIC LIVER DISEASE (MULTI): ICD-10-CM

## 2024-06-17 DIAGNOSIS — I10 PRIMARY HYPERTENSION: ICD-10-CM

## 2024-06-17 DIAGNOSIS — E78.2 MIXED HYPERLIPIDEMIA: ICD-10-CM

## 2024-06-17 PROBLEM — J18.9 PNEUMONIA: Status: RESOLVED | Noted: 2024-05-22 | Resolved: 2024-06-17

## 2024-06-17 PROCEDURE — 4004F PT TOBACCO SCREEN RCVD TLK: CPT | Performed by: FAMILY MEDICINE

## 2024-06-17 PROCEDURE — 3079F DIAST BP 80-89 MM HG: CPT | Performed by: FAMILY MEDICINE

## 2024-06-17 PROCEDURE — 3074F SYST BP LT 130 MM HG: CPT | Performed by: FAMILY MEDICINE

## 2024-06-17 PROCEDURE — 99213 OFFICE O/P EST LOW 20 MIN: CPT | Performed by: FAMILY MEDICINE

## 2024-06-17 RX ORDER — ROSUVASTATIN CALCIUM 10 MG/1
10 TABLET, COATED ORAL DAILY
Qty: 100 TABLET | Refills: 3 | Status: SHIPPED | OUTPATIENT
Start: 2024-06-17 | End: 2025-07-22

## 2024-06-17 ASSESSMENT — ENCOUNTER SYMPTOMS
COUGH: 0
ABDOMINAL PAIN: 0
DYSPHORIC MOOD: 0
DIARRHEA: 0
SPEECH DIFFICULTY: 0
CHEST TIGHTNESS: 0
APPETITE CHANGE: 0
SHORTNESS OF BREATH: 0
ACTIVITY CHANGE: 0
SLEEP DISTURBANCE: 0
PALPITATIONS: 0
CONSTIPATION: 0
FATIGUE: 0
NERVOUS/ANXIOUS: 0
NAUSEA: 0
VOMITING: 0

## 2024-06-17 NOTE — ASSESSMENT & PLAN NOTE
Patient has been able to gain weight, encouraged about diet, post dilation of the esophagus which has helped.

## 2024-06-17 NOTE — ASSESSMENT & PLAN NOTE
Post EGD with dilation, to have repeat done in September, advised to hold aspirin 1 week prior to procedure.

## 2024-06-17 NOTE — PATIENT INSTRUCTIONS
Re-start aspirin 81 mg a day, and stop 1 week before next scope.  Start cholesterol medicine for stroke prevention and work on stopping smoking and keep alcohol to no more than 2 drinks a day

## 2024-06-17 NOTE — ASSESSMENT & PLAN NOTE
Continue to work on quitting smoking, has reduced alcohol, advised to start baby aspirin 81 mg a day, hold 1 week prior to next EGD, start statin medication, blood pressures under good control, uses a wheeled walker for balance, advised about fall risk.

## 2024-06-17 NOTE — PROGRESS NOTES
"Subjective   Patient ID: Anthony Whyte is a 58 y.o. male who presents for 1 MO F/U.    HPI   No headache, chest pain, shortness of breath, dizziness, lightheadedness, or edema  Had EGD with dilation, to have repeat scope in September  Has been able to gain weight in the past month, still having some trouble swallowing some foods, using some Boost for supplementation  Still smoking 2-3 cigarettes a day, ETOH less than 2 a day  Stopped ASA for now    Review of Systems   Constitutional:  Negative for activity change, appetite change and fatigue.   Respiratory:  Negative for cough, chest tightness and shortness of breath.    Cardiovascular:  Negative for chest pain, palpitations and leg swelling.   Gastrointestinal:  Negative for abdominal pain, constipation, diarrhea, nausea and vomiting.   Musculoskeletal:  Positive for gait problem.   Neurological:  Negative for speech difficulty.   Psychiatric/Behavioral:  Negative for dysphoric mood and sleep disturbance. The patient is not nervous/anxious.        Objective   /80   Pulse 83   Ht 1.778 m (5' 10\")   Wt 57.7 kg (127 lb 1.6 oz)   SpO2 100%   BMI 18.24 kg/m²     Physical Exam  Vitals and nursing note reviewed.   Constitutional:       Appearance: Normal appearance.   HENT:      Head: Normocephalic and atraumatic.      Right Ear: Tympanic membrane, ear canal and external ear normal.      Left Ear: Tympanic membrane, ear canal and external ear normal.      Nose: Nose normal.      Mouth/Throat:      Mouth: Mucous membranes are moist.      Pharynx: Oropharynx is clear.   Cardiovascular:      Rate and Rhythm: Normal rate and regular rhythm.      Pulses: Normal pulses.      Heart sounds: Normal heart sounds.   Pulmonary:      Effort: Pulmonary effort is normal.      Breath sounds: Normal breath sounds.   Musculoskeletal:      Cervical back: Normal range of motion and neck supple.   Neurological:      Mental Status: He is alert.   Psychiatric:         Mood and Affect: " Mood normal.         Behavior: Behavior normal.         Assessment/Plan   Problem List Items Addressed This Visit             ICD-10-CM    GERD (gastroesophageal reflux disease) K21.9     Post EGD with dilation, to have repeat done in September, advised to hold aspirin 1 week prior to procedure.         Hyperlipidemia E78.5     Start low-dose rosuvastatin for stroke prevention.         Relevant Medications    rosuvastatin (Crestor) 10 mg tablet    Other Relevant Orders    Comprehensive Metabolic Panel    Lipid Panel    Follow Up In Primary Care - Established    Hypertension I10     Blood pressure under good control, continue with current medications, encouraged to continue to work on quitting smoking and reduce alcohol intake.         Relevant Orders    Comprehensive Metabolic Panel    Follow Up In Primary Care - Established    Stroke (Multi) - Primary I63.9     Continue to work on quitting smoking, has reduced alcohol, advised to start baby aspirin 81 mg a day, hold 1 week prior to next EGD, start statin medication, blood pressures under good control, uses a wheeled walker for balance, advised about fall risk.         Relevant Medications    rosuvastatin (Crestor) 10 mg tablet    Other Relevant Orders    Comprehensive Metabolic Panel    Lipid Panel    Follow Up In Primary Care - Established    Protein-calorie malnutrition, unspecified severity (Multi) E46     Patient has been able to gain weight, encouraged about diet, post dilation of the esophagus which has helped.         Alcoholic liver disease (Multi) K70.9     Has reduced alcohol intake to no more than 2-3 drinks a week, and liver function testing normal.          Other Visit Diagnoses         Codes    Screening for prostate cancer     Z12.5    Relevant Orders    Prostate Specific Antigen, Screen    Follow Up In Primary Care - Established

## 2024-06-17 NOTE — ASSESSMENT & PLAN NOTE
Blood pressure under good control, continue with current medications, encouraged to continue to work on quitting smoking and reduce alcohol intake.

## 2024-06-26 ENCOUNTER — TELEPHONE (OUTPATIENT)
Dept: GASTROENTEROLOGY | Facility: CLINIC | Age: 59
End: 2024-06-26
Payer: MEDICARE

## 2024-06-26 NOTE — TELEPHONE ENCOUNTER
6/7 LVM  6/13 LVM and today 6/26 LVM.     Pt is scheduled to see Dr. Vital on 9/10/24    ----- Message from Jamar Jenkins DO sent at 5/29/2024  1:19 PM EDT -----  Biopsies show no significant pathology consistent with fibrin exudate and deep ulcer.  Patient very tight stricture where able distraught.  Please see prior note to refer to Dr. Jovani Rice in [place] for repeat dilatation

## 2024-09-10 ENCOUNTER — APPOINTMENT (OUTPATIENT)
Dept: GASTROENTEROLOGY | Facility: CLINIC | Age: 59
End: 2024-09-10
Payer: MEDICARE

## 2024-09-17 ENCOUNTER — APPOINTMENT (OUTPATIENT)
Dept: PRIMARY CARE | Facility: CLINIC | Age: 59
End: 2024-09-17
Payer: MEDICARE

## 2024-12-09 ENCOUNTER — APPOINTMENT (OUTPATIENT)
Age: 59
End: 2024-12-09

## 2025-05-15 DIAGNOSIS — I10 PRIMARY HYPERTENSION: ICD-10-CM

## 2025-05-15 RX ORDER — AMLODIPINE BESYLATE 5 MG/1
5 TABLET ORAL DAILY
Qty: 90 TABLET | Refills: 3 | Status: SHIPPED | OUTPATIENT
Start: 2025-05-15 | End: 2026-05-15

## 2025-05-15 RX ORDER — METOPROLOL SUCCINATE 50 MG/1
50 TABLET, EXTENDED RELEASE ORAL DAILY
Qty: 90 TABLET | Refills: 3 | Status: SHIPPED | OUTPATIENT
Start: 2025-05-15 | End: 2026-05-15

## 2025-06-02 ENCOUNTER — APPOINTMENT (OUTPATIENT)
Age: 60
End: 2025-06-02

## 2025-06-09 ENCOUNTER — APPOINTMENT (OUTPATIENT)
Age: 60
End: 2025-06-09

## 2025-06-09 VITALS
BODY MASS INDEX: 20.67 KG/M2 | DIASTOLIC BLOOD PRESSURE: 70 MMHG | SYSTOLIC BLOOD PRESSURE: 100 MMHG | OXYGEN SATURATION: 99 % | HEIGHT: 70 IN | HEART RATE: 111 BPM | WEIGHT: 144.4 LBS

## 2025-06-09 DIAGNOSIS — I63.10 CEREBROVASCULAR ACCIDENT (CVA) DUE TO EMBOLISM OF PRECEREBRAL ARTERY (MULTI): ICD-10-CM

## 2025-06-09 DIAGNOSIS — K21.00 GASTROESOPHAGEAL REFLUX DISEASE WITH ESOPHAGITIS WITHOUT HEMORRHAGE: ICD-10-CM

## 2025-06-09 DIAGNOSIS — Z12.5 SCREENING FOR PROSTATE CANCER: ICD-10-CM

## 2025-06-09 DIAGNOSIS — I10 PRIMARY HYPERTENSION: Primary | ICD-10-CM

## 2025-06-09 DIAGNOSIS — E78.2 MIXED HYPERLIPIDEMIA: ICD-10-CM

## 2025-06-09 DIAGNOSIS — E46 PROTEIN-CALORIE MALNUTRITION, UNSPECIFIED SEVERITY (MULTI): ICD-10-CM

## 2025-06-09 PROCEDURE — 3078F DIAST BP <80 MM HG: CPT | Performed by: FAMILY MEDICINE

## 2025-06-09 PROCEDURE — 3008F BODY MASS INDEX DOCD: CPT | Performed by: FAMILY MEDICINE

## 2025-06-09 PROCEDURE — 99213 OFFICE O/P EST LOW 20 MIN: CPT | Performed by: FAMILY MEDICINE

## 2025-06-09 PROCEDURE — 4004F PT TOBACCO SCREEN RCVD TLK: CPT | Performed by: FAMILY MEDICINE

## 2025-06-09 PROCEDURE — 3074F SYST BP LT 130 MM HG: CPT | Performed by: FAMILY MEDICINE

## 2025-06-09 RX ORDER — OMEPRAZOLE 40 MG/1
40 CAPSULE, DELAYED RELEASE ORAL
Qty: 180 CAPSULE | Refills: 3 | Status: SHIPPED | OUTPATIENT
Start: 2025-06-09 | End: 2026-06-09

## 2025-06-09 RX ORDER — ROSUVASTATIN CALCIUM 10 MG/1
10 TABLET, COATED ORAL DAILY
Qty: 100 TABLET | Refills: 3 | Status: SHIPPED | OUTPATIENT
Start: 2025-06-09 | End: 2026-07-14

## 2025-06-09 ASSESSMENT — ENCOUNTER SYMPTOMS
DEPRESSION: 0
COUGH: 0
NUMBNESS: 0
TROUBLE SWALLOWING: 0
DIARRHEA: 0
WEAKNESS: 1
NERVOUS/ANXIOUS: 0
ACTIVITY CHANGE: 0
DIZZINESS: 0
ABDOMINAL DISTENTION: 0
NAUSEA: 0
DIFFICULTY URINATING: 0
SLEEP DISTURBANCE: 0
LOSS OF SENSATION IN FEET: 0
PALPITATIONS: 0
VOMITING: 0
WHEEZING: 0
HEADACHES: 0
RHINORRHEA: 0
OCCASIONAL FEELINGS OF UNSTEADINESS: 1
UNEXPECTED WEIGHT CHANGE: 0
CONSTIPATION: 0
FATIGUE: 0
SHORTNESS OF BREATH: 0
ADENOPATHY: 0
ARTHRALGIAS: 0
ABDOMINAL PAIN: 0
LIGHT-HEADEDNESS: 0
DYSPHORIC MOOD: 0
APPETITE CHANGE: 0

## 2025-06-09 ASSESSMENT — PATIENT HEALTH QUESTIONNAIRE - PHQ9
1. LITTLE INTEREST OR PLEASURE IN DOING THINGS: NOT AT ALL
SUM OF ALL RESPONSES TO PHQ9 QUESTIONS 1 AND 2: 0
2. FEELING DOWN, DEPRESSED OR HOPELESS: NOT AT ALL

## 2025-06-09 NOTE — ASSESSMENT & PLAN NOTE
Continue to work on quitting smoking, has reduced alcohol, advised to continue baby aspirin 81 mg a day, statin medication, blood pressures under good control, uses a wheeled walker for balance, advised about fall risk.

## 2025-06-09 NOTE — ASSESSMENT & PLAN NOTE
Blood pressure stable, continue current medication, check blood test and assess renal function and electrolytes.

## 2025-06-09 NOTE — PROGRESS NOTES
"Subjective   Patient ID: Anthony Whyte is a 59 y.o. male who presents for Follow-up.    HPI   Had EGD in 2024, some dysphagia at times  No headache, chest pain, shortness of breath, dizziness, lightheadedness, or edema  Taking PPI daily without breakthrough symptoms.  Reviewed dietary, caffeine, tobacco, alcohol, and NSAID use.  No dyspepsia, dysphagia, reflux, melena, or abdominal pain.  Smoking despite  (1 pk every few days)  No falls, using a walker  ETOH on and off (trying to reduce)  No issues with urine or stool    Review of Systems   Constitutional:  Negative for activity change, appetite change, fatigue and unexpected weight change.   HENT:  Negative for ear pain, nosebleeds, rhinorrhea, sneezing and trouble swallowing.    Respiratory:  Negative for cough, shortness of breath and wheezing.    Cardiovascular:  Negative for chest pain, palpitations and leg swelling.   Gastrointestinal:  Negative for abdominal distention, abdominal pain, constipation, diarrhea, nausea and vomiting.   Genitourinary:  Negative for difficulty urinating.   Musculoskeletal:  Positive for gait problem. Negative for arthralgias.   Skin:  Negative for rash.   Neurological:  Positive for weakness. Negative for dizziness, light-headedness, numbness and headaches.   Hematological:  Negative for adenopathy.   Psychiatric/Behavioral:  Negative for behavioral problems, dysphoric mood and sleep disturbance. The patient is not nervous/anxious.    All other systems reviewed and are negative.      Objective   /70   Pulse (!) 111   Ht 1.778 m (5' 10\")   Wt 65.5 kg (144 lb 6.4 oz)   SpO2 99%   BMI 20.72 kg/m²     Physical Exam  Vitals and nursing note reviewed.   Constitutional:       Appearance: Normal appearance.   HENT:      Head: Normocephalic and atraumatic.      Right Ear: Tympanic membrane, ear canal and external ear normal.      Left Ear: Tympanic membrane, ear canal and external ear normal.      Nose: Nose normal.      " Mouth/Throat:      Mouth: Mucous membranes are moist.      Pharynx: Oropharynx is clear.   Cardiovascular:      Rate and Rhythm: Normal rate and regular rhythm.      Pulses: Normal pulses.      Heart sounds: Normal heart sounds.   Pulmonary:      Effort: Pulmonary effort is normal.      Breath sounds: Normal breath sounds.   Musculoskeletal:      Cervical back: Normal range of motion and neck supple.   Skin:     General: Skin is warm and dry.      Capillary Refill: Capillary refill takes less than 2 seconds.   Neurological:      Mental Status: He is alert.   Psychiatric:         Mood and Affect: Mood normal.         Behavior: Behavior normal.         Assessment/Plan   Problem List Items Addressed This Visit           ICD-10-CM    GERD (gastroesophageal reflux disease) K21.9    Stable with omeprazole denies any dysphagia, her GI evaluation was negative         Relevant Medications    omeprazole (PriLOSEC) 40 mg DR capsule    Other Relevant Orders    Follow Up In Primary Care - Established    Hyperlipidemia E78.5    Continue with rosuvastatin, check labs         Relevant Medications    rosuvastatin (Crestor) 10 mg tablet    Other Relevant Orders    Follow Up In Primary Care - Established    Comprehensive Metabolic Panel    Lipid Panel    Hypertension - Primary I10    Blood pressure stable, continue current medication, check blood test and assess renal function and electrolytes.         Relevant Orders    Follow Up In Primary Care - Established    Comprehensive Metabolic Panel    Stroke (Multi) I63.9    Continue to work on quitting smoking, has reduced alcohol, advised to continue baby aspirin 81 mg a day, statin medication, blood pressures under good control, uses a wheeled walker for balance, advised about fall risk.         Relevant Medications    rosuvastatin (Crestor) 10 mg tablet    Other Relevant Orders    Follow Up In Primary Care - Established    Protein-calorie malnutrition, unspecified severity (Multi) E46     Patient has been able to gain weight, living with family.          Other Visit Diagnoses         Codes      Screening for prostate cancer     Z12.5    Relevant Orders    Follow Up In Primary Care - Established    Prostate Specific Antigen, Screen             Patient was identified as a fall risk. Risk prevention instructions provided.

## 2025-06-10 LAB
ALBUMIN SERPL-MCNC: 5.1 G/DL (ref 3.6–5.1)
ALP SERPL-CCNC: 87 U/L (ref 35–144)
ALT SERPL-CCNC: 16 U/L (ref 9–46)
ANION GAP SERPL CALCULATED.4IONS-SCNC: 15 MMOL/L (CALC) (ref 7–17)
AST SERPL-CCNC: 22 U/L (ref 10–35)
BILIRUB SERPL-MCNC: 0.6 MG/DL (ref 0.2–1.2)
BUN SERPL-MCNC: 7 MG/DL (ref 7–25)
CALCIUM SERPL-MCNC: 10.3 MG/DL (ref 8.6–10.3)
CHLORIDE SERPL-SCNC: 98 MMOL/L (ref 98–110)
CHOLEST SERPL-MCNC: 179 MG/DL
CHOLEST/HDLC SERPL: 2.4 (CALC)
CO2 SERPL-SCNC: 24 MMOL/L (ref 20–32)
CREAT SERPL-MCNC: 0.88 MG/DL (ref 0.7–1.3)
EGFRCR SERPLBLD CKD-EPI 2021: 100 ML/MIN/1.73M2
GLUCOSE SERPL-MCNC: 98 MG/DL (ref 65–99)
HDLC SERPL-MCNC: 74 MG/DL
LDLC SERPL CALC-MCNC: 86 MG/DL (CALC)
NONHDLC SERPL-MCNC: 105 MG/DL (CALC)
POTASSIUM SERPL-SCNC: 4.1 MMOL/L (ref 3.5–5.3)
PROT SERPL-MCNC: 8.1 G/DL (ref 6.1–8.1)
PSA SERPL-MCNC: 4.58 NG/ML
SODIUM SERPL-SCNC: 137 MMOL/L (ref 135–146)
TRIGL SERPL-MCNC: 98 MG/DL

## 2025-06-10 NOTE — RESULT ENCOUNTER NOTE
Please let the patient know that other than his PSA or prostate testing being slightly high, his sugar, kidneys, liver and cholesterol all appear to be okay.

## 2025-06-13 ENCOUNTER — TELEPHONE (OUTPATIENT)
Age: 60
End: 2025-06-13

## 2025-06-13 NOTE — TELEPHONE ENCOUNTER
----- Message from Anshul Licea sent at 6/10/2025  7:41 AM EDT -----  Please let the patient know that other than his PSA or prostate testing being slightly high, his sugar, kidneys, liver and cholesterol all appear to be okay.  ----- Message -----  From: Tamara [facility] Results In  Sent: 6/10/2025   5:37 AM EDT  To: Anshul Licea MD

## 2026-06-10 ENCOUNTER — APPOINTMENT (OUTPATIENT)
Age: 61
End: 2026-06-10